# Patient Record
Sex: MALE | Race: WHITE | NOT HISPANIC OR LATINO | Employment: OTHER | URBAN - METROPOLITAN AREA
[De-identification: names, ages, dates, MRNs, and addresses within clinical notes are randomized per-mention and may not be internally consistent; named-entity substitution may affect disease eponyms.]

---

## 2022-09-15 ENCOUNTER — OFFICE VISIT (OUTPATIENT)
Dept: INTERNAL MEDICINE | Facility: CLINIC | Age: 67
End: 2022-09-15

## 2022-09-15 VITALS
HEIGHT: 68 IN | HEART RATE: 94 BPM | BODY MASS INDEX: 21.7 KG/M2 | WEIGHT: 143.2 LBS | SYSTOLIC BLOOD PRESSURE: 130 MMHG | TEMPERATURE: 98 F | DIASTOLIC BLOOD PRESSURE: 68 MMHG | OXYGEN SATURATION: 99 %

## 2022-09-15 DIAGNOSIS — R39.9 LOWER URINARY TRACT SYMPTOMS: ICD-10-CM

## 2022-09-15 DIAGNOSIS — N20.0 KIDNEY STONE: Primary | ICD-10-CM

## 2022-09-15 DIAGNOSIS — Z13.220 SCREENING FOR LIPID DISORDERS: ICD-10-CM

## 2022-09-15 LAB
BUN SERPL-MCNC: 10 MG/DL (ref 8–23)
BUN/CREAT SERPL: 10.9 (ref 7–25)
CALCIUM SERPL-MCNC: 9.6 MG/DL (ref 8.6–10.5)
CHLORIDE SERPL-SCNC: 98 MMOL/L (ref 98–107)
CHOLEST SERPL-MCNC: 178 MG/DL (ref 0–200)
CO2 SERPL-SCNC: 27 MMOL/L (ref 22–29)
CREAT SERPL-MCNC: 0.92 MG/DL (ref 0.76–1.27)
EGFRCR-CYS SERPLBLD CKD-EPI 2021: 90.6 ML/MIN/1.73
GLUCOSE SERPL-MCNC: 225 MG/DL (ref 65–99)
HDLC SERPL-MCNC: 39 MG/DL (ref 40–60)
LDLC SERPL CALC-MCNC: 107 MG/DL (ref 0–100)
POTASSIUM SERPL-SCNC: 3.4 MMOL/L (ref 3.5–5.2)
SODIUM SERPL-SCNC: 138 MMOL/L (ref 136–145)
TRIGL SERPL-MCNC: 184 MG/DL (ref 0–150)
VLDLC SERPL CALC-MCNC: 32 MG/DL (ref 5–40)

## 2022-09-15 PROCEDURE — 99204 OFFICE O/P NEW MOD 45 MIN: CPT | Performed by: INTERNAL MEDICINE

## 2022-09-15 RX ORDER — TAMSULOSIN HYDROCHLORIDE 0.4 MG/1
1 CAPSULE ORAL DAILY
Qty: 90 CAPSULE | Refills: 3 | Status: SHIPPED | OUTPATIENT
Start: 2022-09-15 | End: 2023-09-15

## 2022-09-15 NOTE — ASSESSMENT & PLAN NOTE
- Previously prescribed and has had some relief of urinary symptoms  - Wrote for 1 year  - u/a, BMP today

## 2022-09-15 NOTE — ASSESSMENT & PLAN NOTE
- Has had this his entire life  - No direct relative with kidney stones, but some more distant ones with this issue.   - Right sided CVA tenderness and right anterior abdominal pain  - Struggles to eat full meals due to pain.  Describes feeling stones within his penis.   - I am concerned about his pain, thenar muscle wasting, and temporal wasting, but we will complete a stepwise work up insuring that I take cost into account.  Patient shared some of his beautiful art with me and shared stories of the home where he and his wife live.  He is very concerned about ever losing the house.  We will work together to get him fully worked up and well cared for, but not incur unnecessary high cost.   - BMP, urinalysis today

## 2022-09-15 NOTE — PROGRESS NOTES
"Chief Complaint  Establish Care and discuss kidney stones    Subjective        Eagle Pineda presents to [facility]  History of Present Illness    Mr. Pineda is a ingris 66 yo M who presents to establish care.  He has not regularly seen a physician in quite some time.  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     Objective   Vital Signs:  /68   Pulse 94   Temp 98 °F (36.7 °C)   Ht 172.7 cm (68\")   Wt 65 kg (143 lb 3.2 oz)   SpO2 99%   BMI 21.77 kg/m²   Estimated body mass index is 21.77 kg/m² as calculated from the following:    Height as of this encounter: 172.7 cm (68\").    Weight as of this encounter: 65 kg (143 lb 3.2 oz).    BMI is within normal parameters. No other follow-up for BMI required.      Physical Exam  Vitals reviewed.   Constitutional:       General: He is not in acute distress.     Comments: Thin appearing male   HENT:      Head: Normocephalic and atraumatic.      Comments: Temporal wasting b/l     Nose: Nose normal.      Mouth/Throat:      Mouth: Mucous membranes are moist.   Eyes:      Conjunctiva/sclera: Conjunctivae normal.   Cardiovascular:      Rate and Rhythm: Normal rate and regular rhythm.      Pulses: Normal pulses.      Heart sounds: Normal heart sounds.   Pulmonary:      Effort: Pulmonary effort is normal.      Breath sounds: Normal breath sounds.   Abdominal:      Palpations: Abdomen is soft.      Tenderness: There is abdominal tenderness. There is right CVA tenderness.      Comments: Mild TTP in LLQ and moderate TTP at right CVA   Musculoskeletal:      Right lower leg: No edema.      Left lower leg: No edema.      Comments: Right thenar wasting   Skin:     General: Skin is warm and dry.   Neurological:      General: No focal deficit present.      Mental Status: He is alert.   Psychiatric:         Mood and Affect: Mood normal.        Result Review :  The following data was reviewed by: Eri CUI" MD Cyndi on 09/15/2022:      Data reviewed: no data available to review.           Assessment and Plan   Diagnoses and all orders for this visit:    1. Kidney stone (Primary)  Assessment & Plan:  - Has had this his entire life  - No direct relative with kidney stones, but some more distant ones with this issue.   - Right sided CVA tenderness and right anterior abdominal pain  - Struggles to eat full meals due to pain.  Describes feeling stones within his penis.   - I am concerned about his pain, thenar muscle wasting, and temporal wasting, but we will complete a stepwise work up insuring that I take cost into account.  Patient shared some of his beautiful art with me and shared stories of the home where he and his wife live.  He is very concerned about ever losing the house.  We will work together to get him fully worked up and well cared for, but not incur unnecessary high cost.   - BMP, urinalysis today    Orders:  -     Basic metabolic panel  -     Urinalysis With Culture If Indicated - Urine, Clean Catch    2. Screening for lipid disorders  -     Lipid panel    3. Lower urinary tract symptoms  Assessment & Plan:  - Previously prescribed and has had some relief of urinary symptoms  - Wrote for 1 year  - u/a, BMP today     Orders:  -     tamsulosin (FLOMAX) 0.4 MG capsule 24 hr capsule; Take 1 capsule by mouth Daily.  Dispense: 90 capsule; Refill: 3      Discussed several health maintenance metrics today.  Patient prefers to hold off on Hepatitis C screening as he has no exposures and prefers to minimize cost.  Would be open to a colonoscopy, but again would want to be sure that insurance will cover so we will re-address at next follow up.  Discussed benefits of COVID vaccination.  Patient not opposed to vaccine at all.  Very concerned about any side effects that could take him away from his wife.  He will think about getting COVID vaccine and we will check in next time.   I do think he needs imaging of his  abdomen at some point, but will plan to run whatever testing I would do through his insurance prior to ordering.  We will plan his follow up when I see him with his wife again next week.          Follow Up   No follow-ups on file.  Patient was given instructions and counseling regarding his condition or for health maintenance advice. Please see specific information pulled into the AVS if appropriate.

## 2022-09-22 DIAGNOSIS — N20.0 KIDNEY STONE: Primary | ICD-10-CM

## 2022-09-22 RX ORDER — POTASSIUM CITRATE 10 MEQ/1
10 TABLET, EXTENDED RELEASE ORAL
Qty: 270 TABLET | Refills: 0 | Status: SHIPPED | OUTPATIENT
Start: 2022-09-22 | End: 2022-12-21

## 2022-09-22 NOTE — PROGRESS NOTES
Saw patient at wife's establish care visit and we talked about his labs as well as initiation of potassium citrate.  He will also try Miralax for constipation.  I will see them together in 3 months.